# Patient Record
Sex: FEMALE | Race: WHITE | NOT HISPANIC OR LATINO | Employment: UNEMPLOYED | URBAN - METROPOLITAN AREA
[De-identification: names, ages, dates, MRNs, and addresses within clinical notes are randomized per-mention and may not be internally consistent; named-entity substitution may affect disease eponyms.]

---

## 2023-01-15 PROBLEM — O60.00 ACTIVE PRETERM LABOR: Status: ACTIVE | Noted: 2023-01-15

## 2023-01-16 PROBLEM — O60.00 ACTIVE PRETERM LABOR: Status: RESOLVED | Noted: 2023-01-15 | Resolved: 2023-01-16

## 2024-02-05 LAB
BACTERIA SPEC AEROBE CULT: NO GROWTH
C TRACH RRNA SPEC DONR QL NAA+PROBE: NEGATIVE
EXTERNAL ABO GROUPING: NORMAL
EXTERNAL ANTIBODY SCREEN: NORMAL
EXTERNAL HEMATOCRIT: 38 %
EXTERNAL HEMOGLOBIN: 12.7 G/DL
EXTERNAL HEPATITIS B SURFACE ANTIGEN: NEGATIVE
EXTERNAL NIPT: NORMAL
EXTERNAL PLATELET COUNT: 370 K/ΜL
EXTERNAL RH FACTOR: POSITIVE
EXTERNAL SYPHILIS RPR SCREEN: NORMAL
HBA1C MFR BLD: 5.7 %
HCV AB S/CO SERPL IA: NEGATIVE
HIV 1+2 AB+HIV1 P24 AG SERPL QL IA: NEGATIVE
N GONORRHOEA DNA SPEC QL NAA+PROBE: NEGATIVE
RUBV IGG SERPL IA-ACNC: 50

## 2024-02-15 LAB — EXTERNAL THYROID STIMULATING HORMONE: 0.18 M[IU]/ML

## 2024-04-10 ENCOUNTER — INITIAL PRENATAL (OUTPATIENT)
Dept: OBSTETRICS AND GYNECOLOGY | Facility: CLINIC | Age: 29
End: 2024-04-10
Payer: COMMERCIAL

## 2024-04-10 VITALS — DIASTOLIC BLOOD PRESSURE: 72 MMHG | SYSTOLIC BLOOD PRESSURE: 120 MMHG | WEIGHT: 168 LBS | BODY MASS INDEX: 27.13 KG/M2

## 2024-04-10 DIAGNOSIS — R79.89 ELEVATED TSH: ICD-10-CM

## 2024-04-10 DIAGNOSIS — Z34.00 INITIAL OBSTETRIC VISIT, ANTEPARTUM: Primary | ICD-10-CM

## 2024-04-10 DIAGNOSIS — O09.899 HISTORY OF PRETERM DELIVERY, CURRENTLY PREGNANT: ICD-10-CM

## 2024-04-10 DIAGNOSIS — Z82.79 FAMILY HISTORY OF DOWN SYNDROME: ICD-10-CM

## 2024-04-10 DIAGNOSIS — Z36.9 ENCOUNTER FOR ANTENATAL SCREENING, UNSPECIFIED: ICD-10-CM

## 2024-04-10 DIAGNOSIS — R73.09 ELEVATED HEMOGLOBIN A1C: ICD-10-CM

## 2024-04-10 LAB
BILIRUB BLD-MCNC: NEGATIVE MG/DL
CLARITY, POC: CLEAR
COLOR UR: YELLOW
GLUCOSE UR STRIP-MCNC: NEGATIVE MG/DL
KETONES UR QL: NEGATIVE
LEUKOCYTE EST, POC: NEGATIVE
NITRITE UR-MCNC: NEGATIVE MG/ML
PH UR: 5 [PH] (ref 5–8)
PROT UR STRIP-MCNC: NEGATIVE MG/DL
RBC # UR STRIP: NEGATIVE /UL
SP GR UR: 1.02 (ref 1–1.03)
UROBILINOGEN UR QL: NORMAL

## 2024-04-10 PROCEDURE — 0501F PRENATAL FLOW SHEET: CPT | Performed by: NURSE PRACTITIONER

## 2024-04-10 RX ORDER — FLUOXETINE HYDROCHLORIDE 20 MG/1
20 CAPSULE ORAL DAILY
Qty: 90 CAPSULE | Refills: 3 | Status: SHIPPED | OUTPATIENT
Start: 2024-04-10
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
## 2024-04-11 DIAGNOSIS — R79.89 ABNORMAL THYROID BLOOD TEST: Primary | ICD-10-CM

## 2024-04-11 LAB
AMPHETAMINES UR QL SCN: NEGATIVE NG/ML
BARBITURATES UR QL SCN: NEGATIVE NG/ML
BENZODIAZ UR QL SCN: NEGATIVE NG/ML
BUPRENORPHINE UR QL: NEGATIVE NG/ML
BZE UR QL SCN: NEGATIVE NG/ML
CANNABINOIDS UR QL SCN: NEGATIVE NG/ML
CREAT UR-MCNC: 138.9 MG/DL (ref 20–300)
FENTANYL UR-MCNC: NEGATIVE PG/ML
LABORATORY COMMENT REPORT: NORMAL
MEPERIDINE UR QL: NEGATIVE NG/ML
METHADONE UR QL SCN: NEGATIVE NG/ML
OPIATES UR QL SCN: NEGATIVE NG/ML
OXYCODONE+OXYMORPHONE UR QL SCN: NEGATIVE NG/ML
PCP UR QL: NEGATIVE NG/ML
PH UR: 5.5 [PH] (ref 4.5–8.9)
PROPOXYPH UR QL SCN: NEGATIVE NG/ML
T3 SERPL-MCNC: 204 NG/DL (ref 71–180)
T4 FREE SERPL-MCNC: 0.96 NG/DL (ref 0.82–1.77)
THYROPEROXIDASE AB SERPL-ACNC: 10 IU/ML (ref 0–34)
TRAMADOL UR QL SCN: NEGATIVE NG/ML
TSH SERPL DL<=0.005 MIU/L-ACNC: 0.38 UIU/ML (ref 0.45–4.5)
VZV IGG SER IA-ACNC: <135 INDEX

## 2024-04-12 LAB
AFP INTERP SERPL-IMP: NORMAL
AFP INTERP SERPL-IMP: NORMAL
AFP MOM SERPL: 0.53
AFP SERPL-MCNC: 19.7 NG/ML
AGE AT DELIVERY: 26.8 YR
GA METHOD: NORMAL
GA: 17.2 WEEKS
IDDM PATIENT QL: NORMAL
LABORATORY COMMENT REPORT: NORMAL
MULTIPLE PREGNANCY: NO
NEURAL TUBE DEFECT RISK FETUS: NORMAL %
RESULT: NORMAL

## 2024-04-24 ENCOUNTER — ROUTINE PRENATAL (OUTPATIENT)
Dept: OBSTETRICS AND GYNECOLOGY | Facility: CLINIC | Age: 29
End: 2024-04-24
Payer: COMMERCIAL

## 2024-04-24 VITALS — BODY MASS INDEX: 26.81 KG/M2 | DIASTOLIC BLOOD PRESSURE: 84 MMHG | SYSTOLIC BLOOD PRESSURE: 128 MMHG | WEIGHT: 166 LBS

## 2024-04-24 DIAGNOSIS — L03.90 CELLULITIS DUE TO MRSA: ICD-10-CM

## 2024-04-24 DIAGNOSIS — G44.201 INTRACTABLE TENSION-TYPE HEADACHE, UNSPECIFIED CHRONICITY PATTERN: ICD-10-CM

## 2024-04-24 DIAGNOSIS — B95.62 CELLULITIS DUE TO MRSA: ICD-10-CM

## 2024-04-24 DIAGNOSIS — Z86.59 HISTORY OF POSTPARTUM DEPRESSION: ICD-10-CM

## 2024-04-24 DIAGNOSIS — Z82.79 FAMILY HISTORY OF DOWN SYNDROME: ICD-10-CM

## 2024-04-24 DIAGNOSIS — O09.899 MATERNAL VARICELLA, NON-IMMUNE: ICD-10-CM

## 2024-04-24 DIAGNOSIS — O09.899 HISTORY OF PRETERM DELIVERY, CURRENTLY PREGNANT: ICD-10-CM

## 2024-04-24 DIAGNOSIS — Z36.9 ENCOUNTER FOR ANTENATAL SCREENING, UNSPECIFIED: Primary | ICD-10-CM

## 2024-04-24 DIAGNOSIS — L73.2 HIDRADENITIS AXILLARIS: ICD-10-CM

## 2024-04-24 DIAGNOSIS — R79.89 ABNORMAL THYROID BLOOD TEST: ICD-10-CM

## 2024-04-24 DIAGNOSIS — Z87.59 HISTORY OF POSTPARTUM DEPRESSION: ICD-10-CM

## 2024-04-24 DIAGNOSIS — Z28.39 MATERNAL VARICELLA, NON-IMMUNE: ICD-10-CM

## 2024-04-24 DIAGNOSIS — Z87.442 PERSONAL HISTORY OF KIDNEY STONES: ICD-10-CM

## 2024-04-24 LAB
BILIRUB BLD-MCNC: NEGATIVE MG/DL
CLARITY, POC: CLEAR
COLOR UR: YELLOW
GLUCOSE UR STRIP-MCNC: NEGATIVE MG/DL
KETONES UR QL: NEGATIVE
LEUKOCYTE EST, POC: NEGATIVE
NITRITE UR-MCNC: NEGATIVE MG/ML
PH UR: 5 [PH] (ref 5–8)
PROT UR STRIP-MCNC: NEGATIVE MG/DL
RBC # UR STRIP: NEGATIVE /UL
SP GR UR: 1 (ref 1–1.03)
UROBILINOGEN UR QL: NORMAL

## 2024-04-24 PROCEDURE — 0502F SUBSEQUENT PRENATAL CARE: CPT | Performed by: OBSTETRICS & GYNECOLOGY

## 2024-04-24 NOTE — PROGRESS NOTES
Pt here for ob check and early GTT based on prior OB stating she was pre-diabetic.  Pt's A1c here was 5.7; pt has hx of LGA fetus @ 35 weeks.    Endocrinology apt pending.     AFP was neg.    Pt will be moving to [place] for delivery

## 2024-04-25 LAB
CITATION REF LAB TEST: NORMAL
CITATION REF LAB TEST: NORMAL
CLINICAL INFO: NORMAL
CLINICAL INFO: NORMAL
ETHNIC BACKGROUND STATED: NORMAL
ETHNIC BACKGROUND STATED: NORMAL
FMR1 GENE CGG RPT BLD/T QL: NORMAL
GENE DIS ANL CARRIER INTERP-IMP: NORMAL
GENE DIS ANL CARRIER INTERP-IMP: NORMAL
GENE MUT TESTED BLD/T: NORMAL
GLUCOSE 1H P 75 G GLC PO SERPL-MCNC: 167 MG/DL (ref 70–179)
GLUCOSE 2H P 75 G GLC PO SERPL-MCNC: 146 MG/DL (ref 70–152)
GLUCOSE P FAST SERPL-MCNC: 88 MG/DL (ref 70–91)
HCT VFR BLD AUTO: 36.2 % (ref 34–46.6)
HGB BLD-MCNC: 11.8 G/DL (ref 11.1–15.9)
LAB DIRECTOR NAME PROVIDER: NORMAL
LAB DIRECTOR NAME PROVIDER: NORMAL
REASON FOR REFERRAL (NARRATIVE): NORMAL
REASON FOR REFERRAL (NARRATIVE): NORMAL
RECOMMENDATION PATIENT DOC-IMP: NORMAL
RECOMMENDATION PATIENT DOC-IMP: NORMAL
REF LAB TEST METHOD: NORMAL
REF LAB TEST METHOD: NORMAL
RPR SER QL: NON REACTIVE
SERVICE CMNT-IMP: NORMAL
SERVICE CMNT-IMP: NORMAL
SMN1 GENE MUT ANL BLD/T: NORMAL
SPECIMEN SOURCE: NORMAL
SPECIMEN SOURCE: NORMAL

## 2024-05-08 ENCOUNTER — ROUTINE PRENATAL (OUTPATIENT)
Dept: OBSTETRICS AND GYNECOLOGY | Facility: CLINIC | Age: 29
End: 2024-05-08
Payer: COMMERCIAL

## 2024-05-08 VITALS — WEIGHT: 166 LBS | BODY MASS INDEX: 26.81 KG/M2 | SYSTOLIC BLOOD PRESSURE: 122 MMHG | DIASTOLIC BLOOD PRESSURE: 70 MMHG

## 2024-05-08 DIAGNOSIS — E04.9 ENLARGED THYROID: ICD-10-CM

## 2024-05-08 DIAGNOSIS — Z36.9 ENCOUNTER FOR ANTENATAL SCREENING, UNSPECIFIED: ICD-10-CM

## 2024-05-08 DIAGNOSIS — Z34.92 PRENATAL CARE IN SECOND TRIMESTER: Primary | ICD-10-CM

## 2024-05-08 LAB
BASOPHILS # BLD AUTO: 0.02 10*3/MM3 (ref 0–0.2)
BASOPHILS NFR BLD AUTO: 0.2 % (ref 0–1.5)
BILIRUB BLD-MCNC: NEGATIVE MG/DL
CLARITY, POC: CLEAR
COLOR UR: YELLOW
EOSINOPHIL # BLD AUTO: 0.12 10*3/MM3 (ref 0–0.4)
EOSINOPHIL NFR BLD AUTO: 1.2 % (ref 0.3–6.2)
ERYTHROCYTE [DISTWIDTH] IN BLOOD BY AUTOMATED COUNT: 13.5 % (ref 12.3–15.4)
GLUCOSE UR STRIP-MCNC: NEGATIVE MG/DL
HCT VFR BLD AUTO: 35.1 % (ref 34–46.6)
HGB BLD-MCNC: 11.5 G/DL (ref 12–15.9)
IMM GRANULOCYTES # BLD AUTO: 0.04 10*3/MM3 (ref 0–0.05)
IMM GRANULOCYTES NFR BLD AUTO: 0.4 % (ref 0–0.5)
KETONES UR QL: NEGATIVE
LEUKOCYTE EST, POC: NEGATIVE
LYMPHOCYTES # BLD AUTO: 1.89 10*3/MM3 (ref 0.7–3.1)
LYMPHOCYTES NFR BLD AUTO: 19 % (ref 19.6–45.3)
MCH RBC QN AUTO: 28.4 PG (ref 26.6–33)
MCHC RBC AUTO-ENTMCNC: 32.8 G/DL (ref 31.5–35.7)
MCV RBC AUTO: 86.7 FL (ref 79–97)
MONOCYTES # BLD AUTO: 0.59 10*3/MM3 (ref 0.1–0.9)
MONOCYTES NFR BLD AUTO: 5.9 % (ref 5–12)
NEUTROPHILS # BLD AUTO: 7.28 10*3/MM3 (ref 1.7–7)
NEUTROPHILS NFR BLD AUTO: 73.3 % (ref 42.7–76)
NITRITE UR-MCNC: NEGATIVE MG/ML
NRBC BLD AUTO-RTO: 0 /100 WBC (ref 0–0.2)
PH UR: 5 [PH] (ref 5–8)
PLATELET # BLD AUTO: 316 10*3/MM3 (ref 140–450)
PROT UR STRIP-MCNC: NEGATIVE MG/DL
RBC # BLD AUTO: 4.05 10*6/MM3 (ref 3.77–5.28)
RBC # UR STRIP: NEGATIVE /UL
SP GR UR: 1 (ref 1–1.03)
UROBILINOGEN UR QL: NORMAL
WBC # BLD AUTO: 9.94 10*3/MM3 (ref 3.4–10.8)

## 2024-05-10 LAB
CFTR FULL MUT ANL BLD/T SEQ: NORMAL
CITATION REF LAB TEST: NORMAL
CLINICAL INFO: NORMAL
ETHNIC BACKGROUND STATED: NORMAL
GENE DIS ANL CARRIER INTERP-IMP: NORMAL
LAB DIRECTOR NAME PROVIDER: NORMAL
REASON FOR REFERRAL (NARRATIVE): NORMAL
RECOMMENDATION PATIENT DOC-IMP: NORMAL
REF LAB TEST METHOD: NORMAL
SERVICE CMNT-IMP: NORMAL
SPECIMEN SOURCE: NORMAL

## 2024-05-13 RX ORDER — FERROUS GLUCONATE 324(38)MG
324 TABLET ORAL 2 TIMES DAILY
Qty: 60 TABLET | Refills: 2 | Status: SHIPPED | OUTPATIENT
Start: 2024-05-13

## 2024-05-21 ENCOUNTER — HOSPITAL ENCOUNTER (OUTPATIENT)
Facility: HOSPITAL | Age: 29
Discharge: HOME | End: 2024-05-21
Admitting: NURSE PRACTITIONER
Payer: COMMERCIAL

## 2024-05-21 DIAGNOSIS — E04.9 ENLARGED THYROID: ICD-10-CM

## 2024-05-21 PROCEDURE — 76536 US EXAM OF HEAD AND NECK: CPT

## 2024-05-22 ENCOUNTER — TELEPHONE (OUTPATIENT)
Dept: OBSTETRICS AND GYNECOLOGY | Facility: CLINIC | Age: 29
End: 2024-05-22

## 2024-05-22 RX ORDER — FAMOTIDINE 20 MG/1
20 TABLET, FILM COATED ORAL 2 TIMES DAILY
Qty: 60 TABLET | Refills: 1 | Status: SHIPPED | OUTPATIENT
Start: 2024-05-22 | End: 2025-05-22

## 2024-05-22 NOTE — TELEPHONE ENCOUNTER
Caller: Chuckie Erwin    Relationship: Self    Best call back number: [phone]    Who are you requesting to speak with (clinical staff, MANISHA BREWSTER      What was the call regarding: PATIENT IS REQUESTING MEDICATION FOR HEARTBURN. PLEASE ASSIST.

## 2024-05-23 LAB — INFORMED CONSENT NEEDED: NORMAL

## 2024-05-28 ENCOUNTER — ROUTINE PRENATAL (OUTPATIENT)
Dept: OBSTETRICS AND GYNECOLOGY | Facility: CLINIC | Age: 29
End: 2024-05-28
Payer: COMMERCIAL

## 2024-05-28 VITALS — BODY MASS INDEX: 27.94 KG/M2 | DIASTOLIC BLOOD PRESSURE: 62 MMHG | SYSTOLIC BLOOD PRESSURE: 102 MMHG | WEIGHT: 173 LBS

## 2024-05-28 DIAGNOSIS — L73.2 HIDRADENITIS AXILLARIS: ICD-10-CM

## 2024-05-28 DIAGNOSIS — O09.899 MATERNAL VARICELLA, NON-IMMUNE: ICD-10-CM

## 2024-05-28 DIAGNOSIS — O09.899 HISTORY OF PRETERM DELIVERY, CURRENTLY PREGNANT: ICD-10-CM

## 2024-05-28 DIAGNOSIS — Z87.59 HISTORY OF POSTPARTUM DEPRESSION: ICD-10-CM

## 2024-05-28 DIAGNOSIS — Z36.9 ENCOUNTER FOR ANTENATAL SCREENING, UNSPECIFIED: Primary | ICD-10-CM

## 2024-05-28 DIAGNOSIS — Z28.39 MATERNAL VARICELLA, NON-IMMUNE: ICD-10-CM

## 2024-05-28 DIAGNOSIS — Z86.59 HISTORY OF POSTPARTUM DEPRESSION: ICD-10-CM

## 2024-05-28 PROCEDURE — 0502F SUBSEQUENT PRENATAL CARE: CPT | Performed by: OBSTETRICS & GYNECOLOGY

## 2024-05-28 NOTE — PROGRESS NOTES
S:  cc: Pt here for ob office visit and u/s for anatomy completion and CL for hx late  delivery at 36cms.    hpi: Chuckie Erwin is a 28 y.o.  22w6d being seen today for her obstetrical visit.   Patient reports occasional contractions .   Fetal movement: normal. .      Social History     Social History Narrative    Not on file      SMOKER? No      O:  /62   Wt 78.5 kg (173 lb)   LMP 2023   BMI 27.94 kg/m²     Prenatal Assessment  Fetal Heart Rate: present  Movement: Present  Prenatal Vitals  BP: 102/62  Weight: 78.5 kg (173 lb)  Urine Glucose/Protein  Urine Glucose Read-only: Negative  Urine Protein Read-only: Negative  Vaginal Drainage  Draining Fluid: No  Edema  LLE Edema: None  RLE Edema: None  Facial Edema: None    Brief Urine Lab Results  (Last result in the past 365 days)        Color   Clarity   Blood   Leuk Est   Nitrite   Protein   CREAT   Urine HCG        24 0922 Yellow   Clear   Negative   Negative   Negative   Negative                 U/S today:  EFW = 50%, CL = 3.8, cardiac anatomy normal, completed.    A:    DIAGNOSES:  28 y.o.  22w6d  Diagnoses and all orders for this visit:    1. Encounter for  screening, unspecified (Primary)  -     POC Urinalysis Dipstick    2. Hidradenitis axillaris    3. Maternal varicella, non-immune    4. History of postpartum depression    5. History of late  delivery: no MFM consult; check serial CLs      NEW PROBLEMS?  cxts.    P:  Return in about 4 weeks (around 2024) for ob tummy, GTT/HH, Tdap.    U/s for CL    Pt has     Pt instructed to call for results of any testing done today and that failure to call if she has not heard from us could result in inadequate care and treament.  Pt verbalized her understanding.     Elpidio Null MD  10:17 EDT   24

## 2024-06-06 ENCOUNTER — OFFICE VISIT (OUTPATIENT)
Dept: ENDOCRINOLOGY | Age: 29
End: 2024-06-06
Payer: COMMERCIAL

## 2024-06-06 ENCOUNTER — TELEPHONE (OUTPATIENT)
Dept: ENDOCRINOLOGY | Age: 29
End: 2024-06-06

## 2024-06-06 ENCOUNTER — LAB (OUTPATIENT)
Facility: HOSPITAL | Age: 29
End: 2024-06-06
Payer: COMMERCIAL

## 2024-06-06 VITALS
HEIGHT: 66 IN | BODY MASS INDEX: 27.32 KG/M2 | WEIGHT: 170 LBS | DIASTOLIC BLOOD PRESSURE: 78 MMHG | OXYGEN SATURATION: 99 % | HEART RATE: 110 BPM | SYSTOLIC BLOOD PRESSURE: 118 MMHG

## 2024-06-06 DIAGNOSIS — E05.90 SUBCLINICAL HYPERTHYROIDISM: Primary | ICD-10-CM

## 2024-06-06 DIAGNOSIS — E03.9 ACQUIRED HYPOTHYROIDISM: Primary | ICD-10-CM

## 2024-06-06 LAB
T4 FREE SERPL-MCNC: 0.77 NG/DL (ref 0.92–1.68)
TSH SERPL DL<=0.05 MIU/L-ACNC: 0.52 UIU/ML (ref 0.27–4.2)

## 2024-06-06 PROCEDURE — 86376 MICROSOMAL ANTIBODY EACH: CPT | Performed by: STUDENT IN AN ORGANIZED HEALTH CARE EDUCATION/TRAINING PROGRAM

## 2024-06-06 PROCEDURE — 84439 ASSAY OF FREE THYROXINE: CPT | Performed by: STUDENT IN AN ORGANIZED HEALTH CARE EDUCATION/TRAINING PROGRAM

## 2024-06-06 PROCEDURE — 83520 IMMUNOASSAY QUANT NOS NONAB: CPT | Performed by: STUDENT IN AN ORGANIZED HEALTH CARE EDUCATION/TRAINING PROGRAM

## 2024-06-06 PROCEDURE — 86800 THYROGLOBULIN ANTIBODY: CPT | Performed by: STUDENT IN AN ORGANIZED HEALTH CARE EDUCATION/TRAINING PROGRAM

## 2024-06-06 PROCEDURE — 84445 ASSAY OF TSI GLOBULIN: CPT | Performed by: STUDENT IN AN ORGANIZED HEALTH CARE EDUCATION/TRAINING PROGRAM

## 2024-06-06 PROCEDURE — 36415 COLL VENOUS BLD VENIPUNCTURE: CPT | Performed by: STUDENT IN AN ORGANIZED HEALTH CARE EDUCATION/TRAINING PROGRAM

## 2024-06-06 PROCEDURE — 84443 ASSAY THYROID STIM HORMONE: CPT | Performed by: STUDENT IN AN ORGANIZED HEALTH CARE EDUCATION/TRAINING PROGRAM

## 2024-06-06 RX ORDER — LEVOTHYROXINE SODIUM 0.03 MG/1
25 TABLET ORAL
Qty: 90 TABLET | Refills: 1 | Status: SHIPPED | OUTPATIENT
Start: 2024-06-06 | End: 2024-06-06

## 2024-06-06 NOTE — ASSESSMENT & PLAN NOTE
She is visiting from [place]  Reports abnormal thyroid blood tests at her previous pregnancies as well  Transient subclinical hyperthyroidism is common in pregnancy  Affecting 10 to 20% of the normal pregnancy  Does not need treatment  Low TSH is due to weak TSH receptor stimulation by hCG  Due to increase in serum TBG during the pregnancy total T4 and T3 are elevated  Will recheck TFT today  Check thyroid related antibodies  Patient was instructed to monitor her heart rate and follow-up with endocrinology in her town

## 2024-06-06 NOTE — TELEPHONE ENCOUNTER
Called to discuss the results no answer left a voicemail will send her a [facility] message  TSH is normal free T4 is low  Will start levothyroxine 25 mcg daily and repeat labs in 2 weeks   264.1

## 2024-06-06 NOTE — PROGRESS NOTES
"Chief Complaint/reason for consult       Abnormal thyroid blood test      History of Present Illness      Noted to have abnormal thyroid blood test    She is 24 weeks pregnant  Visiting her family from [place]    4/10/2024    TSH 0.380  Free T40.96  Total T3 204  TPO antibody 10  Mother: thyroid cancer     Reports palpitations  Has anemia and takes iron supplements  Denies tremor   No history of miscarriages    Per patient, had abnormal TFT in her previous pregnancies in 2020 and 2023        Objective   Vital Signs:  /78 (BP Location: Left arm, Patient Position: Sitting, Cuff Size: Adult)   Pulse 110   Ht 167.6 cm (65.98\")   Wt 77.1 kg (170 lb)   SpO2 99%   BMI 27.45 kg/m²   Estimated body mass index is 27.45 kg/m² as calculated from the following:    Height as of this encounter: 167.6 cm (65.98\").    Weight as of this encounter: 77.1 kg (170 lb).             Review of Systems   Eyes:  Negative for visual disturbance.   Cardiovascular:  Positive for palpitations.   Gastrointestinal:  Negative for diarrhea, nausea and vomiting.   Endocrine: Negative for heat intolerance.   Neurological:  Negative for dizziness and light-headedness.        Physical Exam  Constitutional:       Appearance: Normal appearance.   HENT:      Head: Normocephalic and atraumatic.   Eyes:      Extraocular Movements: Extraocular movements intact.   Cardiovascular:      Rate and Rhythm: Normal rate and regular rhythm.   Pulmonary:      Effort: Pulmonary effort is normal.      Breath sounds: Normal breath sounds. No wheezing.   Abdominal:      Palpations: Abdomen is soft.      Tenderness: There is no abdominal tenderness.   Musculoskeletal:         General: No swelling. Normal range of motion.      Cervical back: Neck supple. No tenderness.   Neurological:      Mental Status: She is alert and oriented to person, place, and time.      Comments: No tremor   Psychiatric:         Mood and Affect: Mood normal.        Result Review :  The " following data was reviewed by: Mahrokh Nokhbehzaeim, MD on 06/06/2024:    TSH          2/15/2024    00:00 4/10/2024    13:43   TSH   TSH 0.18     0.380       Details          This result is from an external source.                CBC          2/5/2024    00:00 4/24/2024    09:22 5/8/2024    11:26   CBC   WBC   9.94    RBC   4.05    Hemoglobin 12.7     11.8  11.5    Hematocrit 38     36.2  35.1    MCV   86.7    MCH   28.4    MCHC   32.8    RDW   13.5    Platelets 370      316       Details          This result is from an external source.              TSH          2/15/2024    00:00 4/10/2024    13:43   TSH   TSH 0.18     0.380       Details          This result is from an external source.              Free T4   Date Value Ref Range Status   04/10/2024 0.96 0.82 - 1.77 ng/dL Final      Thyroid Peroxidase Antibody   Date Value Ref Range Status   04/10/2024 10 0 - 34 IU/mL Final      Data reviewed : Radiologic studies Thyroid us              Assessment and Plan   Diagnoses and all orders for this visit:    1. Subclinical hyperthyroidism (Primary)  Assessment & Plan:  She is visiting from [place]  Reports abnormal thyroid blood tests at her previous pregnancies as well  Transient subclinical hyperthyroidism is common in pregnancy  Affecting 10 to 20% of the normal pregnancy  Does not need treatment  Low TSH is due to weak TSH receptor stimulation by hCG  Due to increase in serum TBG during the pregnancy total T4 and T3 are elevated  Will recheck TFT today  Check thyroid related antibodies  Patient was instructed to monitor her heart rate and follow-up with endocrinology in her town    Orders:  -     TSH  -     T4, Free  -     Thyrotropin Receptor Antibody  -     Thyroglobulin Antibody  -     Thyroid Peroxidase Antibody  -     Thyroid Stimulating Immunoglobulin      Instructed to drink enough water and stay hydrated     Follow Up   Return in about 3 months (around 9/6/2024).  Patient was given instructions and counseling  regarding her condition or for health maintenance advice. Please see specific information pulled into the AVS if appropriate.

## 2024-06-07 ENCOUNTER — TELEPHONE (OUTPATIENT)
Dept: ENDOCRINOLOGY | Age: 29
End: 2024-06-07
Payer: COMMERCIAL

## 2024-06-07 DIAGNOSIS — E05.90 SUBCLINICAL HYPERTHYROIDISM: Primary | ICD-10-CM

## 2024-06-07 LAB
THYROGLOB AB SERPL-ACNC: <1 IU/ML (ref 0–0.9)
THYROPEROXIDASE AB SERPL-ACNC: 12 IU/ML (ref 0–34)

## 2024-06-07 NOTE — TELEPHONE ENCOUNTER
Called and spoke with patient  I was initially planning to start her on levothyroxine however since this is a first time that she has a low free T4 and also reports palpitations   will hold off on levothyroxine for now   Will repeat TSH, free T4, free T3 , reverse T3 in 1 week   (To rule out nonthyroidal illness)    Levothyroxine prescription canceled

## 2024-06-08 LAB
TSH RECEP AB SER-ACNC: <1.1 IU/L (ref 0–1.75)
TSI SER-ACNC: <0.1 IU/L (ref 0–0.55)

## 2024-06-18 ENCOUNTER — TELEPHONE (OUTPATIENT)
Dept: ENDOCRINOLOGY | Age: 29
End: 2024-06-18
Payer: COMMERCIAL

## 2024-06-18 DIAGNOSIS — E07.81 EUTHYROID SICK SYNDROME: Primary | ICD-10-CM

## 2024-06-18 NOTE — TELEPHONE ENCOUNTER
Called and discussed the results  TSH 0.577  Free T4 is 0.83, improving  Free T3 is normal and reverse T3 is elevated consistent with euthyroid sick syndrome  No treatment is required at this point  Repeat TFT in 2 weeks    Still has intermittent palpitations and fatigue, instructed to drink enough water and stay hydrated follow-up with gynecology

## 2024-06-25 ENCOUNTER — ROUTINE PRENATAL (OUTPATIENT)
Dept: OBSTETRICS AND GYNECOLOGY | Facility: CLINIC | Age: 29
End: 2024-06-25
Payer: COMMERCIAL

## 2024-06-25 VITALS — WEIGHT: 179 LBS | BODY MASS INDEX: 28.91 KG/M2 | SYSTOLIC BLOOD PRESSURE: 120 MMHG | DIASTOLIC BLOOD PRESSURE: 78 MMHG

## 2024-06-25 DIAGNOSIS — Z28.39 MATERNAL VARICELLA, NON-IMMUNE: ICD-10-CM

## 2024-06-25 DIAGNOSIS — Z86.59 HISTORY OF POSTPARTUM DEPRESSION: ICD-10-CM

## 2024-06-25 DIAGNOSIS — Z82.79 FAMILY HISTORY OF DOWN SYNDROME: ICD-10-CM

## 2024-06-25 DIAGNOSIS — Z36.9 ENCOUNTER FOR ANTENATAL SCREENING, UNSPECIFIED: Primary | ICD-10-CM

## 2024-06-25 DIAGNOSIS — E05.90 SUBCLINICAL HYPERTHYROIDISM: ICD-10-CM

## 2024-06-25 DIAGNOSIS — Z30.2 ENCOUNTER FOR STERILIZATION: ICD-10-CM

## 2024-06-25 DIAGNOSIS — Z87.59 HISTORY OF POSTPARTUM DEPRESSION: ICD-10-CM

## 2024-06-25 DIAGNOSIS — R73.09 ELEVATED HEMOGLOBIN A1C: ICD-10-CM

## 2024-06-25 DIAGNOSIS — O09.899 MATERNAL VARICELLA, NON-IMMUNE: ICD-10-CM

## 2024-06-25 LAB
BILIRUB BLD-MCNC: NEGATIVE MG/DL
CLARITY, POC: CLEAR
COLOR UR: YELLOW
GLUCOSE UR STRIP-MCNC: ABNORMAL MG/DL
KETONES UR QL: NEGATIVE
LEUKOCYTE EST, POC: NEGATIVE
NITRITE UR-MCNC: NEGATIVE MG/ML
PH UR: 5 [PH] (ref 5–8)
PROT UR STRIP-MCNC: NEGATIVE MG/DL
RBC # UR STRIP: NEGATIVE /UL
SP GR UR: 1.02 (ref 1–1.03)
UROBILINOGEN UR QL: NORMAL

## 2024-06-25 PROCEDURE — 99214 OFFICE O/P EST MOD 30 MIN: CPT | Performed by: STUDENT IN AN ORGANIZED HEALTH CARE EDUCATION/TRAINING PROGRAM

## 2024-06-25 NOTE — PROGRESS NOTES
OB follow up     CC:  Here for prenatal follow up    Chuckie Erwin is a 28 y.o.  26+ being seen today for her obstetrical visit.  Patient reports  having occas nose bleeds.  Doing her 2hr gtt today   She reports good fetal movement. Taking +PNV.     Review of Systems    Genitourinary: Neg for cramping, vaginal bleeding, SROM, or dysuria.       /78   Wt 81.2 kg (179 lb)   LMP 2023   BMI 28.91 kg/m²     Vitals: VSS; AF    General Appearance:  Awake. Alert. Well developed. Well nourished. In no acute distress.    Visual Inspection: ° Abdomen was normal on visual inspection.  Palpation: ° Abdomen was soft. ° Abdominal non-tender.    Uterus: ° Fundal height was normal for gestational age. ° Not tender.  Uterine Adnexae: ° Normal without masses or tenderness.  Neurological:  ° Oriented to time, place, and person.  Skin:  ° General appearance was normal. No bruising or ecchymosis.  Obstetrical: +FM and FCA    Presentation: VTX  Placenta: Posterior  LORETTA: 9.5cm  FCA: 150's           Ultrasound images and report reviewed personally by me.    Full interpretation of ultrasound can be found in the patient's note on the same date of service.     Alberto Mejia, DO     Assessment    1) Pregnancy at 26+  No PTL complaints     2) Lori Low risk. AFP neg. FX/SMA neg. CF neg     3) hx of PTL- Declines progesterone      4) elevated Hgb A1C- 5.7; Passed early 2hr GTT.   Repeat screening @ 28 weeks.      5)  Abnormal TSH/Enlarged thyroid- TSH low. Free T4 and TPO normal. T3 elevated. Has upcoming endocrinology appt. Thyromegaly noted during exam.   Has seen Endo; Plan f/u 3 months     Subclinical hyperthyroidism (Primary)  Assessment & Plan:  She is visiting from [place]  Reports abnormal thyroid blood tests at her previous pregnancies as well  Transient subclinical hyperthyroidism is common in pregnancy  Affecting 10 to 20% of the normal pregnancy  Does not need treatment  Low TSH is due to weak TSH receptor  stimulation by hCG  Due to increase in serum TBG during the pregnancy total T4 and T3 are elevated      6) transfer of care from [place]-   Desires to stay her now      7) PP depression- Taking prozac with good management of symptoms.     8) Varicella non immune- Offer Varivax postpartum.     9) Nose bleeds- Reports short lived. Enc vasoline to inside of nares and vaporizer.     10) Macrosomia Hx   Growth 36wga     11) Sterilization   Sterilization Consult  We discussed all other forms of contraception including pills, patch, vaginal ring, injection, implant, IUD, and vasectomy. We discussed the forms of permanent sterilization including laparoscopic tubal occlusion/partial salpingectomy, and laparoscopic salpingectomy.  We discussed a minimal increased risk of bleeding with the salpingectomy as well as the benefits including ovarian cancer risk reduction and reduced risk of ectopic pregnancy.   We discussed a risk of regret of up to 40% in women <30 years of age.  I asked her to consider if her desires would change if something happened to her current children, if she were to divorce, or if her spouse were to die unexpectedly.  We discussed that this is a permanent procedure and that she would need in vitro fertilization at the cost of up to $20,000 per cycle if she desired a pregnancy after this procedure.    We discussed that risks of surgery also include bleeding, possible need for a transfusion (with associated risks of HIV, hepatitis, and other infectious diseases), infection requiring prolonged hospitalization and treatment with antibiotics, damage to other structures (bowel, bladder, ureters, blood vessels) requiring further surgery necessitating a larger incision to remove or repair affected organs with subsequent poor wound healing, and persistent pain.    She was also counseled that anesthesia carries its own risks and that any major surgery carries the rare risk of blood clots, pulmonary embolism, stroke,  heart attack, or death. All of the patient's questions were answered to her satisfaction and she desires to proceed with surgery.     Desires interval 6-8 weeks PP     Plan    Continue prenatal vitamins   Reviewed this stage of pregnancy  Problem list updated   Follow up in 3 weeks for OB , Tdap      Alberto Mejia DO     6/25/2024  09:28 EDT

## 2024-06-26 DIAGNOSIS — R04.0 EPISTAXIS, RECURRENT: Primary | ICD-10-CM

## 2024-06-26 DIAGNOSIS — O24.410 GDM (GESTATIONAL DIABETES MELLITUS), CLASS A1: Primary | ICD-10-CM

## 2024-06-26 LAB
GLUCOSE 1H P 75 G GLC PO SERPL-MCNC: 210 MG/DL (ref 70–179)
GLUCOSE 2H P 75 G GLC PO SERPL-MCNC: 103 MG/DL (ref 70–152)
GLUCOSE P FAST SERPL-MCNC: 104 MG/DL (ref 70–91)
HCT VFR BLD AUTO: 32.8 % (ref 34–46.6)
HGB BLD-MCNC: 10.6 G/DL (ref 11.1–15.9)
RPR SER QL: NON REACTIVE

## 2024-06-26 RX ORDER — BLOOD-GLUCOSE METER
1 KIT MISCELLANEOUS AS NEEDED
Qty: 1 EACH | Refills: 0 | Status: SHIPPED | OUTPATIENT
Start: 2024-06-26

## 2024-06-26 RX ORDER — LANCETS 30 GAUGE
1 EACH MISCELLANEOUS 4 TIMES DAILY
Qty: 200 EACH | Refills: 1 | Status: SHIPPED | OUTPATIENT
Start: 2024-06-26

## 2024-07-01 ENCOUNTER — TELEPHONE (OUTPATIENT)
Dept: SURGERY | Facility: OTHER | Age: 29
End: 2024-07-01

## 2024-07-01 NOTE — TELEPHONE ENCOUNTER
Caller: Chuckie Erwin    Relationship: Self    Best call back number: [phone]      Who are you requesting to speak with (clinical staff, DR. MOISE      What was the call regarding: PATIENT ADVISED THAT SHE WAS DX WITH [employer]ABET[employer] ABOUT TWO WEEKS AGO AND HER NUMBERS WERE VERY HIGH.  PATIENT WOULD LIKE TO KNOW IF SHE NEEDS TO BE SEEN OR SHOULD BE ON MEDICATION, PLEASE ADVISE.

## 2024-07-01 NOTE — TELEPHONE ENCOUNTER
We need to see her and discuss the levels. With her blood monitoring if 50% of the levels are high with fingerstick we start medication

## 2024-07-02 ENCOUNTER — ROUTINE PRENATAL (OUTPATIENT)
Dept: OBSTETRICS AND GYNECOLOGY | Facility: CLINIC | Age: 29
End: 2024-07-02
Payer: COMMERCIAL

## 2024-07-02 VITALS — SYSTOLIC BLOOD PRESSURE: 124 MMHG | DIASTOLIC BLOOD PRESSURE: 78 MMHG | BODY MASS INDEX: 28.91 KG/M2 | WEIGHT: 179 LBS

## 2024-07-02 DIAGNOSIS — O24.419 GDM, CLASS A2: ICD-10-CM

## 2024-07-02 DIAGNOSIS — Z87.59 HISTORY OF POSTPARTUM DEPRESSION: ICD-10-CM

## 2024-07-02 DIAGNOSIS — R73.09 ELEVATED HEMOGLOBIN A1C: ICD-10-CM

## 2024-07-02 DIAGNOSIS — R87.612 LGSIL ON PAP SMEAR OF CERVIX: ICD-10-CM

## 2024-07-02 DIAGNOSIS — Z30.2 ENCOUNTER FOR STERILIZATION: ICD-10-CM

## 2024-07-02 DIAGNOSIS — Z28.39 MATERNAL VARICELLA, NON-IMMUNE: ICD-10-CM

## 2024-07-02 DIAGNOSIS — Z86.59 HISTORY OF POSTPARTUM DEPRESSION: ICD-10-CM

## 2024-07-02 DIAGNOSIS — Z36.9 ENCOUNTER FOR ANTENATAL SCREENING, UNSPECIFIED: Primary | ICD-10-CM

## 2024-07-02 DIAGNOSIS — O09.899 HISTORY OF PRETERM DELIVERY, CURRENTLY PREGNANT: ICD-10-CM

## 2024-07-02 DIAGNOSIS — O09.899 MATERNAL VARICELLA, NON-IMMUNE: ICD-10-CM

## 2024-07-02 LAB
BILIRUB BLD-MCNC: NEGATIVE MG/DL
CLARITY, POC: CLEAR
COLOR UR: YELLOW
GLUCOSE UR STRIP-MCNC: NEGATIVE MG/DL
KETONES UR QL: ABNORMAL
LEUKOCYTE EST, POC: ABNORMAL
NITRITE UR-MCNC: NEGATIVE MG/ML
PH UR: 5 [PH] (ref 5–8)
PROT UR STRIP-MCNC: ABNORMAL MG/DL
RBC # UR STRIP: NEGATIVE /UL
SP GR UR: 1 (ref 1–1.03)
UROBILINOGEN UR QL: NORMAL

## 2024-07-02 RX ORDER — GLYBURIDE 2.5 MG/1
2.5 TABLET ORAL 2 TIMES DAILY
Qty: 60 TABLET | Refills: 2 | Status: SHIPPED | OUTPATIENT
Start: 2024-07-02

## 2024-07-02 NOTE — PROGRESS NOTES
OB follow up     CC:  Here for prenatal follow up    Chuckie Erwin is a 28 y.o.  27+ being seen today for her obstetrical visit.  Patient reports  elevated blood sugars with Fasting and PP    She reports good fetal movement. Taking +PNV.     Review of Systems    Genitourinary: Neg for cramping, vaginal bleeding, SROM, or dysuria.       /78   Wt 81.2 kg (179 lb)   LMP 2023   BMI 28.91 kg/m²     Vitals: VSS; AF    General Appearance:  Awake. Alert. Well developed. Well nourished. In no acute distress.    Visual Inspection: ° Abdomen was normal on visual inspection.  Palpation: ° Abdomen was soft. ° Abdominal non-tender.    Uterus: ° Fundal height was normal for gestational age. ° Not tender.  Uterine Adnexae: ° Normal without masses or tenderness.  Neurological:  ° Oriented to time, place, and person.  Skin:  ° General appearance was normal. No bruising or ecchymosis.  Obstetrical: +FM and FCA        Assessment    1) Pregnancy at 27+  No PTL complaints     2) Lori Low risk. AFP neg. FX/SMA neg. CF neg     3) hx of PTL- Declines progesterone      4) A2GDM   Declined insulin   Glyburide started 2.5mg bid (X )   Up to date handout on Diet, GDM, Etc.   Glucose check 1 week   BPP 32wga ( )   Growth 32 and 36wga ( )   IOL 39wga ( Poorly controlled consider 36-38+6 wga)      5)  Abnormal TSH/Enlarged thyroid- TSH low. Free T4 and TPO normal. T3 elevated. Has upcoming endocrinology appt. Thyromegaly noted during exam.   Has seen Endo; Plan f/u 3 months     Subclinical hyperthyroidism (Primary)  Assessment & Plan:  She is visiting from [place]  Reports abnormal thyroid blood tests at her previous pregnancies as well  Transient subclinical hyperthyroidism is common in pregnancy  Affecting 10 to 20% of the normal pregnancy  Does not need treatment  Low TSH is due to weak TSH receptor stimulation by hCG  Due to increase in serum TBG during the pregnancy total T4 and T3 are elevated      6) transfer of care  from [place]-   Desires to stay her now      7) PP depression- Taking prozac with good management of symptoms.     8) Varicella non immune- Offer Varivax postpartum.     9) Nose bleeds- Reports short lived. Enc vasoline to inside of nares and vaporizer.     10) Macrosomia Hx   Growth 36wga     11) Sterilization   Consent signed [id number]     Desires interval 6-8 weeks PP     Plan    Continue prenatal vitamins   Reviewed this stage of pregnancy  Problem list updated   Follow up in 1 weeks for OB , glyburide started 2.5mg bid     Alberto Mejia,      7/2/2024  11:40 EDT

## 2024-07-03 ENCOUNTER — TELEPHONE (OUTPATIENT)
Dept: OBSTETRICS AND GYNECOLOGY | Facility: CLINIC | Age: 29
End: 2024-07-03
Payer: COMMERCIAL

## 2024-07-03 NOTE — TELEPHONE ENCOUNTER
Pt called in concerned that she had some protein in her urine yesterday and she has had a migraine. I assured her that I did not think there was anything to be concerned about. Small amounts of protein can be a sign of not being hydrated enough. Her BP was great yesterday, so I assured her that I do not think she is having signs of preeclampsia. I advised pt to hydrate well today, rest and take some tylenol or motrin for the headache and to call us if her symptoms worsen. Pt understood and was agreeable.

## 2024-07-08 ENCOUNTER — TELEPHONE (OUTPATIENT)
Dept: OBSTETRICS AND GYNECOLOGY | Facility: CLINIC | Age: 29
End: 2024-07-08

## 2024-07-08 NOTE — TELEPHONE ENCOUNTER
Caller: Chuckie Erwin    Relationship: Self    Best call back number: [phone]    What is the best time to reach you:     ASAP    Who are you requesting to speak with (clinical staff, provider,  specific staff member):     DR MOISE OR NURSE    What was the call regarding:     FASTING NUMBERS ARE AS HIGH  AND THAT IS WITH THE MEDICATION RX'ED    PLEASE CALL ASAP

## 2024-07-13 ENCOUNTER — HOSPITAL ENCOUNTER (OUTPATIENT)
Facility: HOSPITAL | Age: 29
Discharge: HOME | End: 2024-07-13
Attending: STUDENT IN AN ORGANIZED HEALTH CARE EDUCATION/TRAINING PROGRAM | Admitting: STUDENT IN AN ORGANIZED HEALTH CARE EDUCATION/TRAINING PROGRAM
Payer: COMMERCIAL

## 2024-07-13 VITALS
SYSTOLIC BLOOD PRESSURE: 119 MMHG | DIASTOLIC BLOOD PRESSURE: 71 MMHG | TEMPERATURE: 98.2 F | RESPIRATION RATE: 16 BRPM | HEART RATE: 109 BPM

## 2024-07-13 PROBLEM — Z34.90 PREGNANCY: Status: ACTIVE | Noted: 2024-07-13

## 2024-07-13 LAB
AMPHET+METHAMPHET UR QL: NEGATIVE
AMPHETAMINES UR QL: NEGATIVE
BACTERIA UR QL AUTO: ABNORMAL /HPF
BARBITURATES UR QL SCN: NEGATIVE
BENZODIAZ UR QL SCN: NEGATIVE
BILIRUB UR QL STRIP: NEGATIVE
BUPRENORPHINE SERPL-MCNC: NEGATIVE NG/ML
CANNABINOIDS SERPL QL: NEGATIVE
CLARITY UR: CLEAR
COCAINE UR QL: NEGATIVE
COLOR UR: YELLOW
GLUCOSE UR STRIP-MCNC: NEGATIVE MG/DL
HGB UR QL STRIP.AUTO: NEGATIVE
HYALINE CASTS UR QL AUTO: ABNORMAL /LPF
KETONES UR QL STRIP: NEGATIVE
LEUKOCYTE ESTERASE UR QL STRIP.AUTO: ABNORMAL
METHADONE UR QL SCN: NEGATIVE
NITRITE UR QL STRIP: NEGATIVE
OPIATES UR QL: NEGATIVE
OXYCODONE UR QL SCN: NEGATIVE
PCP UR QL SCN: NEGATIVE
PH UR STRIP.AUTO: 5.5 [PH] (ref 4.5–8)
PROT UR QL STRIP: NEGATIVE
RBC # UR STRIP: ABNORMAL /HPF
REF LAB TEST METHOD: ABNORMAL
SP GR UR STRIP: <=1.005 (ref 1–1.03)
SQUAMOUS #/AREA URNS HPF: ABNORMAL /HPF
TRICYCLICS UR QL SCN: NEGATIVE
UROBILINOGEN UR QL STRIP: ABNORMAL
WBC # UR STRIP: ABNORMAL /HPF

## 2024-07-13 PROCEDURE — 59025 FETAL NON-STRESS TEST: CPT

## 2024-07-13 PROCEDURE — 81001 URINALYSIS AUTO W/SCOPE: CPT | Performed by: STUDENT IN AN ORGANIZED HEALTH CARE EDUCATION/TRAINING PROGRAM

## 2024-07-13 PROCEDURE — G0463 HOSPITAL OUTPT CLINIC VISIT: HCPCS

## 2024-07-13 PROCEDURE — 80306 DRUG TEST PRSMV INSTRMNT: CPT | Performed by: STUDENT IN AN ORGANIZED HEALTH CARE EDUCATION/TRAINING PROGRAM

## 2024-07-13 PROCEDURE — 59025 FETAL NON-STRESS TEST: CPT | Performed by: STUDENT IN AN ORGANIZED HEALTH CARE EDUCATION/TRAINING PROGRAM

## 2024-07-13 PROCEDURE — 87086 URINE CULTURE/COLONY COUNT: CPT | Performed by: STUDENT IN AN ORGANIZED HEALTH CARE EDUCATION/TRAINING PROGRAM

## 2024-07-13 NOTE — NURSING NOTE
Pt states she hasn't felt the baby move for about the past 3 hours.  She denies ctx, bleeding, SROM.  After putting pt on monitor, she states she has felt the baby move a few times since arriving at the hospital.

## 2024-07-13 NOTE — NURSING NOTE
D/c instructions discussed w/ pt - she verbalized understanding.  She states she is feeling her baby move well now and isn't feeling shaky after having juice and a snack.  She has an apt next week and is aware of s/s to call MD or go to hospital.  She denies any needs or concerns at this time and pt d/c'ed home in stable ambulatory condition.

## 2024-07-13 NOTE — NON STRESS TEST
Chuckie Erwin, a  at 29w3d with an STEPHEN of 2024, by Ultrasound, was seen at [facility] for a nonstress test.    Chief Complaint   Patient presents with    Decreased Fetal Movement       Patient Active Problem List   Diagnosis    Personal history of kidney stones    hx Cellulitis due to MRSA: L breast abscess w/ I&D    Family history of Down syndrome: normal NIPS, femal    LGSIL on Pap smear of cervix= .     Abnormal thyroid blood test, refer to endocrine    GERD (gastroesophageal reflux disease)    Hidradenitis axillaris    History of postpartum depression    Maternal varicella, non-immune    Intractable headache    Elevated hemoglobin A1c: 5.7    History of late  delivery: no MFM consult; check serial CLs    Subclinical hyperthyroidism    Encounter for sterilization    GDM, class A2    Pregnancy       Start Time: 1545  Stop Time: 1605    Interpretation A  Nonstress Test Interpretation A: Reactive

## 2024-07-15 LAB — BACTERIA SPEC AEROBE CULT: NORMAL

## 2024-07-16 ENCOUNTER — ROUTINE PRENATAL (OUTPATIENT)
Dept: OBSTETRICS AND GYNECOLOGY | Facility: CLINIC | Age: 29
End: 2024-07-16
Payer: COMMERCIAL

## 2024-07-16 VITALS — DIASTOLIC BLOOD PRESSURE: 86 MMHG | SYSTOLIC BLOOD PRESSURE: 138 MMHG | BODY MASS INDEX: 29.55 KG/M2 | WEIGHT: 183 LBS

## 2024-07-16 DIAGNOSIS — Z87.59 HISTORY OF POSTPARTUM DEPRESSION: ICD-10-CM

## 2024-07-16 DIAGNOSIS — Z28.39 MATERNAL VARICELLA, NON-IMMUNE: ICD-10-CM

## 2024-07-16 DIAGNOSIS — R87.612 LGSIL ON PAP SMEAR OF CERVIX: ICD-10-CM

## 2024-07-16 DIAGNOSIS — Z86.59 HISTORY OF POSTPARTUM DEPRESSION: ICD-10-CM

## 2024-07-16 DIAGNOSIS — Z36.9 ENCOUNTER FOR ANTENATAL SCREENING, UNSPECIFIED: Primary | ICD-10-CM

## 2024-07-16 DIAGNOSIS — O24.419 GDM, CLASS A2: ICD-10-CM

## 2024-07-16 DIAGNOSIS — R73.09 ELEVATED HEMOGLOBIN A1C: ICD-10-CM

## 2024-07-16 DIAGNOSIS — O09.899 HISTORY OF PRETERM DELIVERY, CURRENTLY PREGNANT: ICD-10-CM

## 2024-07-16 DIAGNOSIS — O09.899 MATERNAL VARICELLA, NON-IMMUNE: ICD-10-CM

## 2024-07-16 DIAGNOSIS — Z82.79 FAMILY HISTORY OF DOWN SYNDROME: ICD-10-CM

## 2024-07-16 LAB
BILIRUB BLD-MCNC: NEGATIVE MG/DL
CLARITY, POC: CLEAR
COLOR UR: YELLOW
GLUCOSE UR STRIP-MCNC: ABNORMAL MG/DL
KETONES UR QL: NEGATIVE
LEUKOCYTE EST, POC: NEGATIVE
NITRITE UR-MCNC: NEGATIVE MG/ML
PH UR: 5 [PH] (ref 5–8)
PROT UR STRIP-MCNC: ABNORMAL MG/DL
RBC # UR STRIP: NEGATIVE /UL
SP GR UR: 1 (ref 1–1.03)
UROBILINOGEN UR QL: NORMAL

## 2024-07-16 NOTE — PROGRESS NOTES
OB follow up     CC:  Here for prenatal follow up    Chuckie Erwin is a 28 y.o.  29+ being seen today for her obstetrical visit.  Patient reports was in ED for hypoglycemia; stable now.   She reports good fetal movement. Taking +PNV.     Review of Systems    Genitourinary: Neg for cramping, vaginal bleeding, SROM, or dysuria.       /86   Wt 83 kg (183 lb)   LMP 2023   BMI 29.55 kg/m²     Vitals: VSS; AF    General Appearance:  Awake. Alert. Well developed. Well nourished. In no acute distress.    Visual Inspection: ° Abdomen was normal on visual inspection.  Palpation: ° Abdomen was soft. ° Abdominal non-tender.    Uterus: ° Fundal height was normal for gestational age. ° Not tender.  Uterine Adnexae: ° Normal without masses or tenderness.  Neurological:  ° Oriented to time, place, and person.  Skin:  ° General appearance was normal. No bruising or ecchymosis.  Obstetrical: +FM and FCA        Assessment    1) Pregnancy at 29+  No PTL complaints     2) Lori Low risk. AFP neg. FX/SMA neg. CF neg     3) hx of PTL- Declines progesterone   Consider BMZ 34-35wga ( 34+6 wga makes logical sense)      4) A2GDM   Declined insulin   Glyburide started 2.5mg bid (X )     Glucose logs   Fasting All normal   PP All normal      BPP 32wga ( )   Growth 32 and 36wga ( )   IOL 39wga ( Poorly controlled consider 36-38+6 wga)      5)  Abnormal TSH/Enlarged thyroid- TSH low. Free T4 and TPO normal. T3 elevated. Has upcoming endocrinology appt. Thyromegaly noted during exam.   Has seen Endo; Plan f/u 3 months     Subclinical hyperthyroidism (Primary)  Assessment & Plan:  She is visiting from [place]  Reports abnormal thyroid blood tests at her previous pregnancies as well  Transient subclinical hyperthyroidism is common in pregnancy  Affecting 10 to 20% of the normal pregnancy  Does not need treatment  Low TSH is due to weak TSH receptor stimulation by hCG  Due to increase in serum TBG during the pregnancy total T4  and T3 are elevated      6) transfer of care from [place]-   Desires to stay her now      7) PP depression- Taking prozac with good management of symptoms.     8) Varicella non immune- Offer Varivax postpartum.     9) Nose bleeds- Reports short lived. Enc vasoline to inside of nares and vaporizer.     10) Macrosomia Hx   Growth 36wga ( )     11) Sterilization   Consent signed [id number]     Desires interval 6-8 weeks PP     Plan    Continue prenatal vitamins   Reviewed this stage of pregnancy  Problem list updated   Follow up in 2 weeks for OB    Alberto Mejia DO     7/16/2024  13:11 EDT

## 2024-07-17 ENCOUNTER — TELEPHONE (OUTPATIENT)
Dept: OBSTETRICS AND GYNECOLOGY | Facility: CLINIC | Age: 29
End: 2024-07-17

## 2024-07-17 NOTE — TELEPHONE ENCOUNTER
Caller: Chuckie Erwin    Relationship: Self    Best call back number: [phone]    Caller requesting test results: TEST RESULTS FROM YESTERDAY AND HER BP WAS HIGH YESTERDAY AT HER VISIT & WAS HIGH LATER ON AND SHE HAD A HEADACHE. NOW UNK WHAT BP IS BUT DOES NOT HAVE HEADACHE. SHE IS ALSO CONCERNED ABOUT PROTEIN IN URINE.    What test was performed: URINE TEST    When was the test performed: YESTERDAY    Where was the test performed: OFFICE    Additional notes:

## 2024-07-17 NOTE — TELEPHONE ENCOUNTER
Called her and discussed s/s of Pre-e SF. Can do PIH labs next OB appt. Understands when to report to L&D triage

## 2024-07-30 ENCOUNTER — TELEPHONE (OUTPATIENT)
Dept: OBSTETRICS AND GYNECOLOGY | Facility: CLINIC | Age: 29
End: 2024-07-30

## 2024-07-30 ENCOUNTER — ROUTINE PRENATAL (OUTPATIENT)
Dept: OBSTETRICS AND GYNECOLOGY | Facility: CLINIC | Age: 29
End: 2024-07-30
Payer: COMMERCIAL

## 2024-07-30 VITALS — DIASTOLIC BLOOD PRESSURE: 88 MMHG | SYSTOLIC BLOOD PRESSURE: 128 MMHG | BODY MASS INDEX: 29.84 KG/M2 | WEIGHT: 184.8 LBS

## 2024-07-30 DIAGNOSIS — Z34.93 PRENATAL CARE IN THIRD TRIMESTER: Primary | ICD-10-CM

## 2024-07-30 DIAGNOSIS — Z36.9 ENCOUNTER FOR ANTENATAL SCREENING, UNSPECIFIED: ICD-10-CM

## 2024-07-30 DIAGNOSIS — O24.419 GDM, CLASS A2: ICD-10-CM

## 2024-07-30 DIAGNOSIS — Z86.59 HISTORY OF POSTPARTUM DEPRESSION: ICD-10-CM

## 2024-07-30 DIAGNOSIS — Z87.59 HISTORY OF POSTPARTUM DEPRESSION: ICD-10-CM

## 2024-07-30 LAB
BILIRUB BLD-MCNC: NEGATIVE MG/DL
CLARITY, POC: CLEAR
COLOR UR: YELLOW
GLUCOSE UR STRIP-MCNC: NEGATIVE MG/DL
KETONES UR QL: ABNORMAL
LEUKOCYTE EST, POC: NEGATIVE
NITRITE UR-MCNC: NEGATIVE MG/ML
PH UR: 5 [PH] (ref 5–8)
PROT UR STRIP-MCNC: ABNORMAL MG/DL
RBC # UR STRIP: NEGATIVE /UL
SP GR UR: 1 (ref 1–1.03)
UROBILINOGEN UR QL: NORMAL

## 2024-07-30 PROCEDURE — 0502F SUBSEQUENT PRENATAL CARE: CPT | Performed by: NURSE PRACTITIONER

## 2024-07-30 RX ORDER — GLYBURIDE 2.5 MG/1
TABLET ORAL
Qty: 120 TABLET | Refills: 2 | Status: SHIPPED | OUTPATIENT
Start: 2024-07-30

## 2024-07-30 RX ORDER — MAGNESIUM OXIDE 400 MG/1
400 TABLET ORAL DAILY
Qty: 90 TABLET | Refills: 3 | Status: SHIPPED | OUTPATIENT
Start: 2024-07-30

## 2024-07-30 RX ORDER — RIBOFLAVIN (VITAMIN B2) 400 MG
1 TABLET ORAL DAILY
Qty: 90 TABLET | Refills: 3 | Status: SHIPPED | OUTPATIENT
Start: 2024-07-30

## 2024-07-30 NOTE — PROGRESS NOTES
OB follow up > 20 weeks    Chief Complaint   Patient presents with    Routine Prenatal Visit       Chuckie Erwin is a 28 y.o.  31w6d being seen today for her obstetrical visit.  Patient reports  migraines . She reports she only gets migraines during pregnancy. Taking prenatal vitamins: Yes      Review of Systems    Genitourinary: Negative for contractions, cramping, vaginal bleeding, or SROM.   Fetal movement: normal  No Known Allergies     /88   Wt 83.8 kg (184 lb 12.8 oz)   LMP 2023   BMI 29.84 kg/m²     FHT: 134 BPM   Uterine Size: Growth 75%       Assessment    1) pregnancy at 31w6d - US IMP: VTX. BPP 8/8. Growth 75%.  bpm. Posterior placenta. LORETTA 11.18 cm.     2) Lori Low risk. AFP neg. FX/SMA neg. CF neg      3) hx of PTL- Declines progesterone   Consider BMZ 34-35wga ( 34+6 wga makes logical sense)      4) A2GDM - BS log incomplete.   Declined insulin   Glyburide started 2.5mg and 5mg q HS (X )   Fastings elevated. Change to 7.5mg q HS    BPP 32wga   Growth 32 (75%) and 36wga ( )   IOL 39wga ( Poorly controlled consider 36-38+6 wga)      5)  Abnormal TSH/Enlarged thyroid- TSH low. Free T4 and TPO normal. T3 elevated. Has upcoming endocrinology appt. Thyromegaly noted during exam.   Has seen Endo; Plan f/u 3 months      Subclinical hyperthyroidism (Primary)  Assessment & Plan:  She is visiting from [place]  Reports abnormal thyroid blood tests at her previous pregnancies as well  Transient subclinical hyperthyroidism is common in pregnancy  Affecting 10 to 20% of the normal pregnancy  Does not need treatment  Low TSH is due to weak TSH receptor stimulation by hCG  Due to increase in serum TBG during the pregnancy total T4 and T3 are elevated        6) transfer of care from [place]-   Desires to stay her now      7) PP depression- Taking prozac with good management of symptoms.      8) Varicella non immune- Offer Varivax postpartum.      9) Nose bleeds- Reports short lived. Enc vasoline  to inside of nares and vaporizer.      10) Macrosomia Hx   Growth 36wga ( )      11) Sterilization   Consent signed [id number]     Desires interval 6-8 weeks PP      12) Migraines- She feels hydrated well. ERX B2 and Magnesium oxide. Has safe med list at home.     13) Social- Recently moved to [place], [place]. She is going to try and find a provider locally to establish care    Plan    Continue prenatal vitamins  Reviewed this stage of pregnancy  Problem list updated   Follow up in 1 weeks for OB dev and US- BPP     Verenice Lee, APRN  7/30/2024  14:08 EDT

## 2024-07-30 NOTE — TELEPHONE ENCOUNTER
Caller: Chuckie Erwin    Relationship: Self    Best call back number: [phone]    What form or medical record are you requesting: ALL RECORDS FOR HER CURRENT PREGNANCY     Who is requesting this form or medical record from you:   [facility]  Address: [address] [place] [address] [id number], [place] [id number]    How would you like to receive the form or medical records (pick-up, mail, fax): FAX  If fax, what is the fax number: [phone]    Timeframe paperwork needed: ASAP

## 2024-07-31 NOTE — TELEPHONE ENCOUNTER
Called and spoke with patient. Advised her that I am needing a release of information form filled out, and once I receive a completed form I will fax her records.

## 2024-08-02 NOTE — TELEPHONE ENCOUNTER
Caller: Chuckie Erwin    Relationship: Self    Best call back number: [phone]    What was the call regarding: PT CALLED IN ABOUT HER MEDICAL RECORDS. THE OFFICE DID NOT RECEIVE ANYTHING YESTERDAY. THEY TOLD HER IF THE RECORDS WERE FAXED AND WERE MULTIPLE PAGES THAT THEIR FAX WOULD NOT ACCEPT IT. ASKED HER TO HAVE THE RECORDS EMAILED.    PLEASE SEND TO: [email or web address]    OFFICE PHONE #: [phone]

## 2024-08-12 ENCOUNTER — TELEPHONE (OUTPATIENT)
Dept: OBSTETRICS AND GYNECOLOGY | Facility: CLINIC | Age: 29
End: 2024-08-12

## 2024-08-12 NOTE — TELEPHONE ENCOUNTER
Caller: Chuckie Erwin    Relationship: Self    Best call back number: [phone]    What is the best time to reach you: ANY    Who are you requesting to speak with (clinical staff, provider,  specific staff member):     DR MOISE    What was the call regarding:         PT HAD BABY ON 8/10/2024    PLEASE CONTACT PT WITH ANY QUESTIONS

## 2024-08-14 RX ORDER — GLYBURIDE 2.5 MG/1
TABLET ORAL
Qty: 120 TABLET | Refills: 2 | OUTPATIENT
Start: 2024-08-14

## 2025-01-09 ENCOUNTER — TELEPHONE (OUTPATIENT)
Dept: OBSTETRICS AND GYNECOLOGY | Facility: CLINIC | Age: 30
End: 2025-01-09

## 2025-01-09 NOTE — TELEPHONE ENCOUNTER
Hub staff attempted to follow warm transfer process and was unsuccessful     Caller: Chuckie Erwin    Relationship to patient: Self    Best call back number: [phone]    Patient is needing: TUBAL LIGATION CONSULT APPOINTMENT

## 2025-01-20 ENCOUNTER — OFFICE VISIT (OUTPATIENT)
Dept: OBSTETRICS AND GYNECOLOGY | Facility: CLINIC | Age: 30
End: 2025-01-20
Payer: COMMERCIAL

## 2025-01-20 ENCOUNTER — PREP FOR SURGERY (OUTPATIENT)
Dept: OTHER | Facility: HOSPITAL | Age: 30
End: 2025-01-20
Payer: COMMERCIAL

## 2025-01-20 ENCOUNTER — TELEPHONE (OUTPATIENT)
Dept: OBSTETRICS AND GYNECOLOGY | Facility: CLINIC | Age: 30
End: 2025-01-20

## 2025-01-20 VITALS
SYSTOLIC BLOOD PRESSURE: 122 MMHG | BODY MASS INDEX: 29.16 KG/M2 | WEIGHT: 175 LBS | DIASTOLIC BLOOD PRESSURE: 74 MMHG | HEIGHT: 65 IN

## 2025-01-20 DIAGNOSIS — Z30.2 ENCOUNTER FOR STERILIZATION: Primary | ICD-10-CM

## 2025-01-20 PROBLEM — R79.89 ABNORMAL THYROID BLOOD TEST: Status: RESOLVED | Noted: 2020-11-24 | Resolved: 2025-01-20

## 2025-01-20 PROBLEM — K21.9 GERD (GASTROESOPHAGEAL REFLUX DISEASE): Status: RESOLVED | Noted: 2020-12-14 | Resolved: 2025-01-20

## 2025-01-20 PROBLEM — O09.899 HISTORY OF PRETERM DELIVERY, CURRENTLY PREGNANT: Status: RESOLVED | Noted: 2024-04-10 | Resolved: 2025-01-20

## 2025-01-20 PROBLEM — O09.899 MATERNAL VARICELLA, NON-IMMUNE: Status: RESOLVED | Noted: 2022-09-16 | Resolved: 2025-01-20

## 2025-01-20 PROBLEM — Z34.90 PREGNANCY: Status: RESOLVED | Noted: 2024-07-13 | Resolved: 2025-01-20

## 2025-01-20 PROBLEM — Z28.39 MATERNAL VARICELLA, NON-IMMUNE: Status: RESOLVED | Noted: 2022-09-16 | Resolved: 2025-01-20

## 2025-01-20 PROBLEM — O24.419 GDM, CLASS A2: Status: RESOLVED | Noted: 2024-07-02 | Resolved: 2025-01-20

## 2025-01-20 PROCEDURE — 99213 OFFICE O/P EST LOW 20 MIN: CPT | Performed by: STUDENT IN AN ORGANIZED HEALTH CARE EDUCATION/TRAINING PROGRAM

## 2025-01-20 RX ORDER — SODIUM CHLORIDE 0.9 % (FLUSH) 0.9 %
10 SYRINGE (ML) INJECTION EVERY 12 HOURS SCHEDULED
Status: CANCELLED | OUTPATIENT
Start: 2025-01-20

## 2025-01-20 RX ORDER — SODIUM CHLORIDE 0.9 % (FLUSH) 0.9 %
10 SYRINGE (ML) INJECTION AS NEEDED
Status: CANCELLED | OUTPATIENT
Start: 2025-01-20

## 2025-01-20 RX ORDER — SODIUM CHLORIDE 9 MG/ML
40 INJECTION, SOLUTION INTRAVENOUS AS NEEDED
Status: CANCELLED | OUTPATIENT
Start: 2025-01-20
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
## 2025-01-24 ENCOUNTER — TELEPHONE (OUTPATIENT)
Dept: OBSTETRICS AND GYNECOLOGY | Facility: CLINIC | Age: 30
End: 2025-01-24

## 2025-01-24 NOTE — TELEPHONE ENCOUNTER
Provider: DR MOISE    Caller: Chuckie Erwin    Relationship to Patient: Self    Phone Number: [phone] / LVM    Reason for Call: PT WANTS TO CANCEL SURGERY ON 02/05/24 - PT CAN NOT AFFORD IT     PLEASE CALL THE PT TO CONFIRM CANCELLATION